# Patient Record
Sex: FEMALE | Race: WHITE
[De-identification: names, ages, dates, MRNs, and addresses within clinical notes are randomized per-mention and may not be internally consistent; named-entity substitution may affect disease eponyms.]

---

## 2019-12-26 ENCOUNTER — HOSPITAL ENCOUNTER (INPATIENT)
Dept: HOSPITAL 63 - 1 SOUTH | Age: 79
LOS: 5 days | Discharge: NURSING, REHAB OR LONG-TERM CARE | DRG: 193 | End: 2019-12-31
Attending: FAMILY MEDICINE | Admitting: FAMILY MEDICINE
Payer: COMMERCIAL

## 2019-12-26 VITALS — DIASTOLIC BLOOD PRESSURE: 75 MMHG | SYSTOLIC BLOOD PRESSURE: 146 MMHG

## 2019-12-26 VITALS — DIASTOLIC BLOOD PRESSURE: 74 MMHG | SYSTOLIC BLOOD PRESSURE: 153 MMHG

## 2019-12-26 VITALS — SYSTOLIC BLOOD PRESSURE: 172 MMHG | DIASTOLIC BLOOD PRESSURE: 84 MMHG

## 2019-12-26 VITALS — WEIGHT: 198 LBS | BODY MASS INDEX: 37.38 KG/M2 | HEIGHT: 61 IN

## 2019-12-26 VITALS — SYSTOLIC BLOOD PRESSURE: 150 MMHG | DIASTOLIC BLOOD PRESSURE: 69 MMHG

## 2019-12-26 VITALS — DIASTOLIC BLOOD PRESSURE: 77 MMHG | SYSTOLIC BLOOD PRESSURE: 161 MMHG

## 2019-12-26 VITALS — SYSTOLIC BLOOD PRESSURE: 146 MMHG | DIASTOLIC BLOOD PRESSURE: 62 MMHG

## 2019-12-26 DIAGNOSIS — J96.00: ICD-10-CM

## 2019-12-26 DIAGNOSIS — E11.22: ICD-10-CM

## 2019-12-26 DIAGNOSIS — D69.6: ICD-10-CM

## 2019-12-26 DIAGNOSIS — I50.9: ICD-10-CM

## 2019-12-26 DIAGNOSIS — E43: ICD-10-CM

## 2019-12-26 DIAGNOSIS — E11.65: ICD-10-CM

## 2019-12-26 DIAGNOSIS — J10.00: Primary | ICD-10-CM

## 2019-12-26 DIAGNOSIS — N18.3: ICD-10-CM

## 2019-12-26 LAB
ANION GAP SERPL CALC-SCNC: 7 MMOL/L (ref 6–14)
APTT PPP: YELLOW S
BACTERIA #/AREA URNS HPF: 0 /HPF
BASOPHILS # BLD AUTO: 0 X10^3/UL (ref 0–0.2)
BASOPHILS NFR BLD: 0 % (ref 0–3)
BGAS PCO2: 52 MMHG (ref 35–45)
BGAS PH: 7.36 (ref 7.35–7.45)
BGAS PO2: 94 MMHG (ref 71–100)
BILIRUB UR QL STRIP: (no result)
CA-I SERPL ISE-MCNC: 17 MG/DL (ref 7–20)
CALCIUM SERPL-MCNC: 8 MG/DL (ref 8.5–10.1)
CHLORIDE SERPL-SCNC: 101 MMOL/L (ref 98–107)
CO2 SERPL-SCNC: 30 MMOL/L (ref 21–32)
CREAT SERPL-MCNC: 1.3 MG/DL (ref 0.6–1)
DELTA BASE BGAS: 4 MMOL/L (ref 0–3)
EOSINOPHIL NFR BLD: 0 % (ref 0–3)
EOSINOPHIL NFR BLD: 0 X10^3/UL (ref 0–0.7)
ERYTHROCYTE [DISTWIDTH] IN BLOOD BY AUTOMATED COUNT: 14.1 % (ref 11.5–14.5)
FIBRINOGEN PPP-MCNC: CLEAR MG/DL
GFR SERPLBLD BASED ON 1.73 SQ M-ARVRAT: 39.5 ML/MIN
GLUCOSE SERPL-MCNC: 147 MG/DL (ref 70–99)
GLUCOSE UR STRIP-MCNC: (no result) MG/DL
HCT VFR BLD CALC: 41.8 % (ref 36–47)
HGB BLD-MCNC: 13.7 G/DL (ref 12–15.5)
INFLUENZA A PATIENT: POSITIVE
INFLUENZA B PATIENT: NEGATIVE
LYMPHOCYTES # BLD: 0.6 X10^3/UL (ref 1–4.8)
LYMPHOCYTES NFR BLD AUTO: 4 % (ref 24–48)
MCH RBC QN AUTO: 30 PG (ref 25–35)
MCHC RBC AUTO-ENTMCNC: 33 G/DL (ref 31–37)
MCV RBC AUTO: 92 FL (ref 79–100)
MONO #: 1 X10^3/UL (ref 0–1.1)
MONOCYTES NFR BLD: 6 % (ref 0–9)
NEUT #: 14.5 X10^3UL (ref 1.8–7.7)
NEUTROPHILS NFR BLD AUTO: 90 % (ref 31–73)
NITRITE UR QL STRIP: (no result)
O2 SAT BGAS: 96 % (ref 92–99)
O2/TOTAL GAS SETTING VFR VENT: 32 %
PLATELET # BLD AUTO: 119 X10^3/UL (ref 140–400)
POTASSIUM SERPL-SCNC: 4.4 MMOL/L (ref 3.5–5.1)
RBC # BLD AUTO: 4.57 X10^6/UL (ref 3.5–5.4)
RBC #/AREA URNS HPF: (no result) /HPF (ref 0–2)
SODIUM SERPL-SCNC: 138 MMOL/L (ref 136–145)
SP GR UR STRIP: 1.02
SQUAMOUS #/AREA URNS LPF: (no result) /LPF
UROBILINOGEN UR-MCNC: 0.2 MG/DL
WBC # BLD AUTO: 16.1 X10^3/UL (ref 4–11)
WBC #/AREA URNS HPF: (no result) /HPF (ref 0–4)

## 2019-12-26 PROCEDURE — A9558 XE133 XENON 10MCI: HCPCS

## 2019-12-26 PROCEDURE — 81001 URINALYSIS AUTO W/SCOPE: CPT

## 2019-12-26 PROCEDURE — 96374 THER/PROPH/DIAG INJ IV PUSH: CPT

## 2019-12-26 PROCEDURE — 82803 BLOOD GASES ANY COMBINATION: CPT

## 2019-12-26 PROCEDURE — 87449 NOS EACH ORGANISM AG IA: CPT

## 2019-12-26 PROCEDURE — A9540 TC99M MAA: HCPCS

## 2019-12-26 PROCEDURE — 84145 PROCALCITONIN (PCT): CPT

## 2019-12-26 PROCEDURE — 87040 BLOOD CULTURE FOR BACTERIA: CPT

## 2019-12-26 PROCEDURE — 85025 COMPLETE CBC W/AUTO DIFF WBC: CPT

## 2019-12-26 PROCEDURE — 71045 X-RAY EXAM CHEST 1 VIEW: CPT

## 2019-12-26 PROCEDURE — 78582 LUNG VENTILAT&PERFUS IMAGING: CPT

## 2019-12-26 PROCEDURE — 80202 ASSAY OF VANCOMYCIN: CPT

## 2019-12-26 PROCEDURE — 82550 ASSAY OF CK (CPK): CPT

## 2019-12-26 PROCEDURE — 85007 BL SMEAR W/DIFF WBC COUNT: CPT

## 2019-12-26 PROCEDURE — 85379 FIBRIN DEGRADATION QUANT: CPT

## 2019-12-26 PROCEDURE — 80053 COMPREHEN METABOLIC PANEL: CPT

## 2019-12-26 PROCEDURE — 87804 INFLUENZA ASSAY W/OPTIC: CPT

## 2019-12-26 PROCEDURE — 80048 BASIC METABOLIC PNL TOTAL CA: CPT

## 2019-12-26 PROCEDURE — 83605 ASSAY OF LACTIC ACID: CPT

## 2019-12-26 PROCEDURE — 36415 COLL VENOUS BLD VENIPUNCTURE: CPT

## 2019-12-26 PROCEDURE — 94640 AIRWAY INHALATION TREATMENT: CPT

## 2019-12-26 PROCEDURE — 93005 ELECTROCARDIOGRAM TRACING: CPT

## 2019-12-26 RX ADMIN — IPRATROPIUM BROMIDE AND ALBUTEROL SULFATE SCH ML: .5; 3 SOLUTION RESPIRATORY (INHALATION) at 21:50

## 2019-12-26 RX ADMIN — OSELTAMIVIR PHOSPHATE SCH MG: 75 CAPSULE ORAL at 20:21

## 2019-12-26 RX ADMIN — VANCOMYCIN HYDROCHLORIDE PRN EACH: 1 INJECTION, POWDER, LYOPHILIZED, FOR SOLUTION INTRAVENOUS at 22:39

## 2019-12-26 NOTE — EKG
[facility] [address], [place] [address]

Test Date:    2019               Test Time:    18:36:56

Pat Name:     TAMY VILLAREAL            Department:   

Patient ID:   [id number]           Room:         117 A

Gender:       F                        Technician:   JUAN

:          [id number]               Requested By: SHAYLA DAVALOS

Order Number: [id number]            Reading MD:     

                                 Measurements

Intervals                              Axis          

Rate:         143                      P:            -90

NC:           88                       QRS:          -40

QRSD:         108                      T:            26

QT:           294                                    

QTc:          459                                    

                           Interpretive Statements

SUPRAVENTRICULAR TACHYCARDIA

COMPLEX(ES) WITH ABERRANT INTRAVENTRICULAR CONDUCTION

VENTRICULAR PREMATURE COMPLEX(ES)

ABNORMAL LEFT AXIS DEVIATION

R-S TRANSITION ZONE IN V LEADS DISPLACED TO THE LEFT

LOW LIMB LEAD VOLTAGE

QRS(T) CONTOUR ABNORMALITY

CONSISTENT WITH INFERIOR INFARCT

AGE UNDETERMINED

ABNORMAL ECG

RI6.02

No previous ECG available for comparison

## 2019-12-26 NOTE — RAD
EXAM: AP View of the chest

 

DATE: 12/26/2019 5:14 PM

 

INDICATION: Shortness of air 

 

COMPARISON: No Prior

 

FINDINGS/

IMPRESSION:

Moderate cardiomegaly. Aorta is tortuous. Trace left pleural effusion. 

Patchy opacities medial right lung base may represent atelectasis or 

developing consolidation.

 

Electronically signed by: Erlin Landeros MD (12/26/2019 6:55 PM) [facility]

## 2019-12-27 VITALS — SYSTOLIC BLOOD PRESSURE: 140 MMHG | DIASTOLIC BLOOD PRESSURE: 68 MMHG

## 2019-12-27 VITALS — DIASTOLIC BLOOD PRESSURE: 55 MMHG | SYSTOLIC BLOOD PRESSURE: 135 MMHG

## 2019-12-27 VITALS — SYSTOLIC BLOOD PRESSURE: 111 MMHG | DIASTOLIC BLOOD PRESSURE: 60 MMHG

## 2019-12-27 VITALS — SYSTOLIC BLOOD PRESSURE: 128 MMHG | DIASTOLIC BLOOD PRESSURE: 61 MMHG

## 2019-12-27 VITALS — SYSTOLIC BLOOD PRESSURE: 124 MMHG | DIASTOLIC BLOOD PRESSURE: 64 MMHG

## 2019-12-27 VITALS — SYSTOLIC BLOOD PRESSURE: 119 MMHG | DIASTOLIC BLOOD PRESSURE: 59 MMHG

## 2019-12-27 VITALS — SYSTOLIC BLOOD PRESSURE: 134 MMHG | DIASTOLIC BLOOD PRESSURE: 66 MMHG

## 2019-12-27 VITALS — DIASTOLIC BLOOD PRESSURE: 66 MMHG | SYSTOLIC BLOOD PRESSURE: 137 MMHG

## 2019-12-27 VITALS — DIASTOLIC BLOOD PRESSURE: 57 MMHG | SYSTOLIC BLOOD PRESSURE: 127 MMHG

## 2019-12-27 VITALS — DIASTOLIC BLOOD PRESSURE: 67 MMHG | SYSTOLIC BLOOD PRESSURE: 136 MMHG

## 2019-12-27 VITALS — SYSTOLIC BLOOD PRESSURE: 139 MMHG | DIASTOLIC BLOOD PRESSURE: 68 MMHG

## 2019-12-27 VITALS — SYSTOLIC BLOOD PRESSURE: 130 MMHG | DIASTOLIC BLOOD PRESSURE: 61 MMHG

## 2019-12-27 VITALS — SYSTOLIC BLOOD PRESSURE: 128 MMHG | DIASTOLIC BLOOD PRESSURE: 58 MMHG

## 2019-12-27 VITALS — DIASTOLIC BLOOD PRESSURE: 60 MMHG | SYSTOLIC BLOOD PRESSURE: 121 MMHG

## 2019-12-27 VITALS — SYSTOLIC BLOOD PRESSURE: 125 MMHG | DIASTOLIC BLOOD PRESSURE: 71 MMHG

## 2019-12-27 VITALS — SYSTOLIC BLOOD PRESSURE: 121 MMHG | DIASTOLIC BLOOD PRESSURE: 68 MMHG

## 2019-12-27 VITALS — SYSTOLIC BLOOD PRESSURE: 139 MMHG | DIASTOLIC BLOOD PRESSURE: 71 MMHG

## 2019-12-27 VITALS — SYSTOLIC BLOOD PRESSURE: 126 MMHG | DIASTOLIC BLOOD PRESSURE: 63 MMHG

## 2019-12-27 VITALS — SYSTOLIC BLOOD PRESSURE: 143 MMHG | DIASTOLIC BLOOD PRESSURE: 70 MMHG

## 2019-12-27 VITALS — DIASTOLIC BLOOD PRESSURE: 74 MMHG | SYSTOLIC BLOOD PRESSURE: 140 MMHG

## 2019-12-27 VITALS — DIASTOLIC BLOOD PRESSURE: 56 MMHG | SYSTOLIC BLOOD PRESSURE: 126 MMHG

## 2019-12-27 VITALS — DIASTOLIC BLOOD PRESSURE: 61 MMHG | SYSTOLIC BLOOD PRESSURE: 119 MMHG

## 2019-12-27 LAB
% LYMPHS: 2 % (ref 24–48)
% MONOS: 6 % (ref 0–10)
% SEGS: 92 % (ref 35–66)
ANION GAP SERPL CALC-SCNC: 6 MMOL/L (ref 6–14)
CA-I SERPL ISE-MCNC: 18 MG/DL (ref 7–20)
CALCIUM SERPL-MCNC: 7.4 MG/DL (ref 8.5–10.1)
CHLORIDE SERPL-SCNC: 103 MMOL/L (ref 98–107)
CO2 SERPL-SCNC: 30 MMOL/L (ref 21–32)
CREAT SERPL-MCNC: 1.4 MG/DL (ref 0.6–1)
DACRYOCYTES BLD QL SMEAR: (no result)
GFR SERPLBLD BASED ON 1.73 SQ M-ARVRAT: 36.3 ML/MIN
GLUCOSE SERPL-MCNC: 123 MG/DL (ref 70–99)
PLATELET # BLD EST: ADEQUATE 10*3/UL
POLYCHROMASIA BLD QL SMEAR: SLIGHT
POTASSIUM SERPL-SCNC: 4.4 MMOL/L (ref 3.5–5.1)
SODIUM SERPL-SCNC: 139 MMOL/L (ref 136–145)

## 2019-12-27 RX ADMIN — OSELTAMIVIR PHOSPHATE SCH MG: 75 CAPSULE ORAL at 20:04

## 2019-12-27 RX ADMIN — ENOXAPARIN SODIUM SCH MG: 100 INJECTION SUBCUTANEOUS at 09:54

## 2019-12-27 RX ADMIN — PIPERACILLIN SODIUM AND TAZOBACTAM SODIUM SCH MLS/HR: 2; .25 INJECTION, POWDER, LYOPHILIZED, FOR SOLUTION INTRAVENOUS at 15:06

## 2019-12-27 RX ADMIN — IPRATROPIUM BROMIDE AND ALBUTEROL SULFATE SCH ML: .5; 3 SOLUTION RESPIRATORY (INHALATION) at 15:23

## 2019-12-27 RX ADMIN — PIPERACILLIN SODIUM AND TAZOBACTAM SODIUM SCH MLS/HR: 2; .25 INJECTION, POWDER, LYOPHILIZED, FOR SOLUTION INTRAVENOUS at 05:34

## 2019-12-27 RX ADMIN — Medication SCH CAP: at 20:04

## 2019-12-27 RX ADMIN — PIPERACILLIN SODIUM AND TAZOBACTAM SODIUM SCH MLS/HR: 2; .25 INJECTION, POWDER, LYOPHILIZED, FOR SOLUTION INTRAVENOUS at 00:01

## 2019-12-27 RX ADMIN — PIPERACILLIN SODIUM AND TAZOBACTAM SODIUM SCH MLS/HR: 2; .25 INJECTION, POWDER, LYOPHILIZED, FOR SOLUTION INTRAVENOUS at 18:42

## 2019-12-27 RX ADMIN — OSELTAMIVIR PHOSPHATE SCH MG: 75 CAPSULE ORAL at 09:53

## 2019-12-27 RX ADMIN — Medication SCH CAP: at 09:53

## 2019-12-27 RX ADMIN — IPRATROPIUM BROMIDE AND ALBUTEROL SULFATE SCH ML: .5; 3 SOLUTION RESPIRATORY (INHALATION) at 21:19

## 2019-12-27 RX ADMIN — VANCOMYCIN HYDROCHLORIDE SCH MLS/HR: 1 INJECTION, POWDER, LYOPHILIZED, FOR SOLUTION INTRAVENOUS at 20:04

## 2019-12-27 RX ADMIN — IPRATROPIUM BROMIDE AND ALBUTEROL SULFATE SCH ML: .5; 3 SOLUTION RESPIRATORY (INHALATION) at 06:02

## 2019-12-27 RX ADMIN — PIPERACILLIN SODIUM AND TAZOBACTAM SODIUM SCH MLS/HR: 2; .25 INJECTION, POWDER, LYOPHILIZED, FOR SOLUTION INTRAVENOUS at 23:33

## 2019-12-27 RX ADMIN — IPRATROPIUM BROMIDE AND ALBUTEROL SULFATE SCH ML: .5; 3 SOLUTION RESPIRATORY (INHALATION) at 09:10

## 2019-12-27 NOTE — PN
DATE:  



SUBJECTIVE:  The patient admitted with acute respiratory failure, pneumonia and

influenza A.  She is doing somewhat better this morning.  Her severe

bronchospasm has been diminished.  Blood pressure 127/57, respiratory rate 33,

pulse 103.  The patient otherwise is afebrile, although she was running 99.4,

given a rectal suppository.  She is on 5 liters at 96%.  The patient is

receiving IV antibiotic therapy for the pneumonia as well as Antiflu medication.



OBJECTIVE

LUNGS:  Diminished, improved, still rhonchi.

CARDIOVASCULAR:  Tachycardic.

ABDOMEN:  Soft, nontender.

EXTREMITIES:  No clubbing, cyanosis, or edema.  The patient is extremely hard of

hearing.



IMPRESSION:  Influenza A pneumonia of unspecified etiology secondary to

influenza, acute respiratory failure and chronic kidney disease stage 3.



PLAN:  Continue to monitor the patient, accordingly make further evaluation with

aggressive pulmonary toilet and IV antibiotic therapy and Antiflu.





______________________________

SHAYLA DAVALOS MD



DR:  NICO/jayson  JOB#:  [id number] / [id number]

DD:  12/27/2019 09:54  DT:  12/27/2019 10:49

## 2019-12-28 VITALS — SYSTOLIC BLOOD PRESSURE: 140 MMHG | DIASTOLIC BLOOD PRESSURE: 63 MMHG

## 2019-12-28 VITALS — DIASTOLIC BLOOD PRESSURE: 65 MMHG | SYSTOLIC BLOOD PRESSURE: 131 MMHG

## 2019-12-28 VITALS — DIASTOLIC BLOOD PRESSURE: 54 MMHG | SYSTOLIC BLOOD PRESSURE: 106 MMHG

## 2019-12-28 VITALS — DIASTOLIC BLOOD PRESSURE: 65 MMHG | SYSTOLIC BLOOD PRESSURE: 138 MMHG

## 2019-12-28 VITALS — DIASTOLIC BLOOD PRESSURE: 66 MMHG | SYSTOLIC BLOOD PRESSURE: 124 MMHG

## 2019-12-28 VITALS — SYSTOLIC BLOOD PRESSURE: 124 MMHG | DIASTOLIC BLOOD PRESSURE: 55 MMHG

## 2019-12-28 VITALS — DIASTOLIC BLOOD PRESSURE: 70 MMHG | SYSTOLIC BLOOD PRESSURE: 144 MMHG

## 2019-12-28 VITALS — DIASTOLIC BLOOD PRESSURE: 66 MMHG | SYSTOLIC BLOOD PRESSURE: 135 MMHG

## 2019-12-28 VITALS — SYSTOLIC BLOOD PRESSURE: 130 MMHG | DIASTOLIC BLOOD PRESSURE: 55 MMHG

## 2019-12-28 VITALS — SYSTOLIC BLOOD PRESSURE: 126 MMHG | DIASTOLIC BLOOD PRESSURE: 62 MMHG

## 2019-12-28 VITALS — SYSTOLIC BLOOD PRESSURE: 123 MMHG | DIASTOLIC BLOOD PRESSURE: 60 MMHG

## 2019-12-28 VITALS — DIASTOLIC BLOOD PRESSURE: 78 MMHG | SYSTOLIC BLOOD PRESSURE: 138 MMHG

## 2019-12-28 VITALS — DIASTOLIC BLOOD PRESSURE: 68 MMHG | SYSTOLIC BLOOD PRESSURE: 123 MMHG

## 2019-12-28 VITALS — DIASTOLIC BLOOD PRESSURE: 64 MMHG | SYSTOLIC BLOOD PRESSURE: 121 MMHG

## 2019-12-28 VITALS — DIASTOLIC BLOOD PRESSURE: 66 MMHG | SYSTOLIC BLOOD PRESSURE: 137 MMHG

## 2019-12-28 VITALS — SYSTOLIC BLOOD PRESSURE: 139 MMHG | DIASTOLIC BLOOD PRESSURE: 73 MMHG

## 2019-12-28 VITALS — DIASTOLIC BLOOD PRESSURE: 62 MMHG | SYSTOLIC BLOOD PRESSURE: 124 MMHG

## 2019-12-28 VITALS — DIASTOLIC BLOOD PRESSURE: 70 MMHG | SYSTOLIC BLOOD PRESSURE: 143 MMHG

## 2019-12-28 VITALS — SYSTOLIC BLOOD PRESSURE: 141 MMHG | DIASTOLIC BLOOD PRESSURE: 63 MMHG

## 2019-12-28 VITALS — DIASTOLIC BLOOD PRESSURE: 64 MMHG | SYSTOLIC BLOOD PRESSURE: 135 MMHG

## 2019-12-28 LAB
ALBUMIN SERPL-MCNC: 2.5 G/DL (ref 3.4–5)
ALBUMIN/GLOB SERPL: 0.7 {RATIO} (ref 1–1.7)
ALP SERPL-CCNC: 59 U/L (ref 46–116)
ALT SERPL-CCNC: 25 U/L (ref 14–59)
ANION GAP SERPL CALC-SCNC: 6 MMOL/L (ref 6–14)
AST SERPL-CCNC: 29 U/L (ref 15–37)
BASOPHILS # BLD AUTO: 0 X10^3/UL (ref 0–0.2)
BASOPHILS NFR BLD: 0 % (ref 0–3)
BILIRUB SERPL-MCNC: 0.5 MG/DL (ref 0.2–1)
BUN/CREAT SERPL: 18 (ref 6–20)
CA-I SERPL ISE-MCNC: 24 MG/DL (ref 7–20)
CALCIUM SERPL-MCNC: 7.2 MG/DL (ref 8.5–10.1)
CHLORIDE SERPL-SCNC: 103 MMOL/L (ref 98–107)
CO2 SERPL-SCNC: 29 MMOL/L (ref 21–32)
CREAT SERPL-MCNC: 1.3 MG/DL (ref 0.6–1)
EOSINOPHIL NFR BLD: 0 % (ref 0–3)
EOSINOPHIL NFR BLD: 0 X10^3/UL (ref 0–0.7)
ERYTHROCYTE [DISTWIDTH] IN BLOOD BY AUTOMATED COUNT: 13.9 % (ref 11.5–14.5)
GFR SERPLBLD BASED ON 1.73 SQ M-ARVRAT: 39.5 ML/MIN
GLOBULIN SER-MCNC: 3.8 G/DL (ref 2.2–3.8)
GLUCOSE SERPL-MCNC: 115 MG/DL (ref 70–99)
HCT VFR BLD CALC: 37.3 % (ref 36–47)
HGB BLD-MCNC: 12.3 G/DL (ref 12–15.5)
LYMPHOCYTES # BLD: 1.8 X10^3/UL (ref 1–4.8)
LYMPHOCYTES NFR BLD AUTO: 11 % (ref 24–48)
MCH RBC QN AUTO: 30 PG (ref 25–35)
MCHC RBC AUTO-ENTMCNC: 33 G/DL (ref 31–37)
MCV RBC AUTO: 90 FL (ref 79–100)
MONO #: 0.9 X10^3/UL (ref 0–1.1)
MONOCYTES NFR BLD: 5 % (ref 0–9)
NEUT #: 14.1 X10^3UL (ref 1.8–7.7)
NEUTROPHILS NFR BLD AUTO: 84 % (ref 31–73)
PLATELET # BLD AUTO: 91 X10^3/UL (ref 140–400)
POTASSIUM SERPL-SCNC: 3.9 MMOL/L (ref 3.5–5.1)
PROT SERPL-MCNC: 6.3 G/DL (ref 6.4–8.2)
RBC # BLD AUTO: 4.16 X10^6/UL (ref 3.5–5.4)
SODIUM SERPL-SCNC: 138 MMOL/L (ref 136–145)
VANC TR: 13.7 MCG/ML (ref 10–20)
WBC # BLD AUTO: 16.8 X10^3/UL (ref 4–11)

## 2019-12-28 RX ADMIN — IPRATROPIUM BROMIDE AND ALBUTEROL SULFATE SCH ML: .5; 3 SOLUTION RESPIRATORY (INHALATION) at 15:52

## 2019-12-28 RX ADMIN — IPRATROPIUM BROMIDE AND ALBUTEROL SULFATE SCH ML: .5; 3 SOLUTION RESPIRATORY (INHALATION) at 20:17

## 2019-12-28 RX ADMIN — Medication SCH CAP: at 08:37

## 2019-12-28 RX ADMIN — IPRATROPIUM BROMIDE AND ALBUTEROL SULFATE SCH ML: .5; 3 SOLUTION RESPIRATORY (INHALATION) at 05:03

## 2019-12-28 RX ADMIN — ENOXAPARIN SODIUM SCH MG: 100 INJECTION SUBCUTANEOUS at 08:38

## 2019-12-28 RX ADMIN — PIPERACILLIN SODIUM AND TAZOBACTAM SODIUM SCH MLS/HR: 2; .25 INJECTION, POWDER, LYOPHILIZED, FOR SOLUTION INTRAVENOUS at 18:03

## 2019-12-28 RX ADMIN — VANCOMYCIN HYDROCHLORIDE PRN EACH: 1 INJECTION, POWDER, LYOPHILIZED, FOR SOLUTION INTRAVENOUS at 20:16

## 2019-12-28 RX ADMIN — VANCOMYCIN HYDROCHLORIDE SCH MLS/HR: 1 INJECTION, POWDER, LYOPHILIZED, FOR SOLUTION INTRAVENOUS at 20:51

## 2019-12-28 RX ADMIN — PIPERACILLIN SODIUM AND TAZOBACTAM SODIUM SCH MLS/HR: 2; .25 INJECTION, POWDER, LYOPHILIZED, FOR SOLUTION INTRAVENOUS at 05:54

## 2019-12-28 RX ADMIN — OSELTAMIVIR PHOSPHATE SCH MG: 75 CAPSULE ORAL at 08:37

## 2019-12-28 RX ADMIN — FUROSEMIDE SCH MG: 10 INJECTION, SOLUTION INTRAMUSCULAR; INTRAVENOUS at 15:54

## 2019-12-28 RX ADMIN — IPRATROPIUM BROMIDE AND ALBUTEROL SULFATE SCH ML: .5; 3 SOLUTION RESPIRATORY (INHALATION) at 10:35

## 2019-12-28 RX ADMIN — Medication SCH CAP: at 20:50

## 2019-12-28 RX ADMIN — PIPERACILLIN SODIUM AND TAZOBACTAM SODIUM SCH MLS/HR: 2; .25 INJECTION, POWDER, LYOPHILIZED, FOR SOLUTION INTRAVENOUS at 12:57

## 2019-12-28 RX ADMIN — OSELTAMIVIR PHOSPHATE SCH MG: 75 CAPSULE ORAL at 20:50

## 2019-12-28 NOTE — PN
DATE:  



SUBJECTIVE:  A 79-year-old female who came in with both influenza A and a

pneumonic process.  The patient has not been diuresing very well.  We give her

additional Lasix, although she finally did diurese.



OBJECTIVE:

VITAL SIGNS:  Blood pressure is 144/70, respiratory rate 20, pulse 92, afebrile,

oxygen saturation on 4 liters 93%.  She is usually on oxygen.  She is doing

better and improving.  We will keep a close eye on her.  Her white count is

still elevated; however, she is on piperacillin and vancomycin as well as

Tamiflu, getting breathing treatments.

LUNGS:  Diminished throughout, but much better.  Some coarse breath sounds, but

better than they have been.

CARDIOVASCULAR:  Irregularly irregular rhythm.

ABDOMEN:  Soft, nontender.

EXTREMITIES:  No clubbing, cyanosis or edema.

NEUROLOGIC:  Intact.



IMPRESSION AND PLAN:  Pneumonia of unspecified etiology secondary to influenza

A, acute respiratory failure, chronic kidney disease stage 3, thrombocytopenia. 

Platelet count of 91,000.  Continue to monitor.  Severe protein malnutrition,

hyperglycemia.  Procalcitonin elevated.





______________________________

SHAYLA DAVALOS MD



DR:  NICO/jayson  JOB#:  [id number] / [id number]

DD:  12/28/2019 15:37  DT:  12/28/2019 22:22

## 2019-12-28 NOTE — RAD
EXAM: CHEST 1 VIEW 

 

History: Shortness of breath 

 

COMPARISON: 12/26/2019

 

TECHNIQUE: Single portable radiograph of the chest

 

FINDINGS:  Mild cardiomegaly. Mild left lung base airspace opacity likely 

atelectasis or infiltrate or minimal effusion.

 

IMPRESSION:  

1. Mild increase in congestive changes. Mild left lung base airspace 

opacity likely atelectasis or infiltrate.

 

 

Electronically signed by: Lavelle Ferguson MD (12/28/2019 10:25 AM) [facility]

## 2019-12-29 VITALS — SYSTOLIC BLOOD PRESSURE: 121 MMHG | DIASTOLIC BLOOD PRESSURE: 63 MMHG

## 2019-12-29 VITALS — SYSTOLIC BLOOD PRESSURE: 142 MMHG | DIASTOLIC BLOOD PRESSURE: 64 MMHG

## 2019-12-29 VITALS — DIASTOLIC BLOOD PRESSURE: 61 MMHG | SYSTOLIC BLOOD PRESSURE: 136 MMHG

## 2019-12-29 VITALS — SYSTOLIC BLOOD PRESSURE: 113 MMHG | DIASTOLIC BLOOD PRESSURE: 58 MMHG

## 2019-12-29 VITALS — SYSTOLIC BLOOD PRESSURE: 124 MMHG | DIASTOLIC BLOOD PRESSURE: 59 MMHG

## 2019-12-29 VITALS — SYSTOLIC BLOOD PRESSURE: 120 MMHG | DIASTOLIC BLOOD PRESSURE: 65 MMHG

## 2019-12-29 VITALS — SYSTOLIC BLOOD PRESSURE: 122 MMHG | DIASTOLIC BLOOD PRESSURE: 60 MMHG

## 2019-12-29 VITALS — SYSTOLIC BLOOD PRESSURE: 132 MMHG | DIASTOLIC BLOOD PRESSURE: 64 MMHG

## 2019-12-29 VITALS — SYSTOLIC BLOOD PRESSURE: 130 MMHG | DIASTOLIC BLOOD PRESSURE: 68 MMHG

## 2019-12-29 VITALS — DIASTOLIC BLOOD PRESSURE: 68 MMHG | SYSTOLIC BLOOD PRESSURE: 128 MMHG

## 2019-12-29 VITALS — SYSTOLIC BLOOD PRESSURE: 129 MMHG | DIASTOLIC BLOOD PRESSURE: 64 MMHG

## 2019-12-29 VITALS — SYSTOLIC BLOOD PRESSURE: 138 MMHG | DIASTOLIC BLOOD PRESSURE: 68 MMHG

## 2019-12-29 VITALS — SYSTOLIC BLOOD PRESSURE: 131 MMHG | DIASTOLIC BLOOD PRESSURE: 58 MMHG

## 2019-12-29 VITALS — SYSTOLIC BLOOD PRESSURE: 142 MMHG | DIASTOLIC BLOOD PRESSURE: 67 MMHG

## 2019-12-29 VITALS — DIASTOLIC BLOOD PRESSURE: 67 MMHG | SYSTOLIC BLOOD PRESSURE: 127 MMHG

## 2019-12-29 LAB
ANION GAP SERPL CALC-SCNC: 8 MMOL/L (ref 6–14)
BASOPHILS # BLD AUTO: 0 X10^3/UL (ref 0–0.2)
BASOPHILS NFR BLD: 0 % (ref 0–3)
CA-I SERPL ISE-MCNC: 22 MG/DL (ref 7–20)
CALCIUM SERPL-MCNC: 6.7 MG/DL (ref 8.5–10.1)
CHLORIDE SERPL-SCNC: 103 MMOL/L (ref 98–107)
CO2 SERPL-SCNC: 31 MMOL/L (ref 21–32)
CREAT SERPL-MCNC: 1.4 MG/DL (ref 0.6–1)
EOSINOPHIL NFR BLD: 0 % (ref 0–3)
EOSINOPHIL NFR BLD: 0 X10^3/UL (ref 0–0.7)
ERYTHROCYTE [DISTWIDTH] IN BLOOD BY AUTOMATED COUNT: 14 % (ref 11.5–14.5)
GFR SERPLBLD BASED ON 1.73 SQ M-ARVRAT: 36.3 ML/MIN
GLUCOSE SERPL-MCNC: 161 MG/DL (ref 70–99)
HCT VFR BLD CALC: 37.4 % (ref 36–47)
HGB BLD-MCNC: 12.3 G/DL (ref 12–15.5)
LYMPHOCYTES # BLD: 1.1 X10^3/UL (ref 1–4.8)
LYMPHOCYTES NFR BLD AUTO: 9 % (ref 24–48)
MCH RBC QN AUTO: 30 PG (ref 25–35)
MCHC RBC AUTO-ENTMCNC: 33 G/DL (ref 31–37)
MCV RBC AUTO: 90 FL (ref 79–100)
MONO #: 0.7 X10^3/UL (ref 0–1.1)
MONOCYTES NFR BLD: 6 % (ref 0–9)
NEUT #: 10 X10^3UL (ref 1.8–7.7)
NEUTROPHILS NFR BLD AUTO: 85 % (ref 31–73)
PLATELET # BLD AUTO: 124 X10^3/UL (ref 140–400)
POTASSIUM SERPL-SCNC: 3.4 MMOL/L (ref 3.5–5.1)
RBC # BLD AUTO: 4.15 X10^6/UL (ref 3.5–5.4)
SODIUM SERPL-SCNC: 142 MMOL/L (ref 136–145)
WBC # BLD AUTO: 11.9 X10^3/UL (ref 4–11)

## 2019-12-29 RX ADMIN — OSELTAMIVIR PHOSPHATE SCH MG: 75 CAPSULE ORAL at 21:01

## 2019-12-29 RX ADMIN — IPRATROPIUM BROMIDE AND ALBUTEROL SULFATE SCH ML: .5; 3 SOLUTION RESPIRATORY (INHALATION) at 05:23

## 2019-12-29 RX ADMIN — PIPERACILLIN SODIUM AND TAZOBACTAM SODIUM SCH MLS/HR: 2; .25 INJECTION, POWDER, LYOPHILIZED, FOR SOLUTION INTRAVENOUS at 00:23

## 2019-12-29 RX ADMIN — IPRATROPIUM BROMIDE AND ALBUTEROL SULFATE SCH ML: .5; 3 SOLUTION RESPIRATORY (INHALATION) at 11:08

## 2019-12-29 RX ADMIN — IPRATROPIUM BROMIDE AND ALBUTEROL SULFATE SCH ML: .5; 3 SOLUTION RESPIRATORY (INHALATION) at 21:32

## 2019-12-29 RX ADMIN — ENOXAPARIN SODIUM SCH MG: 100 INJECTION SUBCUTANEOUS at 09:23

## 2019-12-29 RX ADMIN — PIPERACILLIN SODIUM AND TAZOBACTAM SODIUM SCH MLS/HR: 2; .25 INJECTION, POWDER, LYOPHILIZED, FOR SOLUTION INTRAVENOUS at 11:19

## 2019-12-29 RX ADMIN — IPRATROPIUM BROMIDE AND ALBUTEROL SULFATE SCH ML: .5; 3 SOLUTION RESPIRATORY (INHALATION) at 16:27

## 2019-12-29 RX ADMIN — PIPERACILLIN SODIUM AND TAZOBACTAM SODIUM SCH MLS/HR: 2; .25 INJECTION, POWDER, LYOPHILIZED, FOR SOLUTION INTRAVENOUS at 06:18

## 2019-12-29 RX ADMIN — OSELTAMIVIR PHOSPHATE SCH MG: 75 CAPSULE ORAL at 09:23

## 2019-12-29 RX ADMIN — Medication SCH CAP: at 21:01

## 2019-12-29 RX ADMIN — FUROSEMIDE SCH MG: 10 INJECTION, SOLUTION INTRAMUSCULAR; INTRAVENOUS at 09:23

## 2019-12-29 RX ADMIN — Medication SCH CAP: at 09:23

## 2019-12-29 RX ADMIN — VANCOMYCIN HYDROCHLORIDE SCH MLS/HR: 1 INJECTION, POWDER, LYOPHILIZED, FOR SOLUTION INTRAVENOUS at 21:11

## 2019-12-29 RX ADMIN — PIPERACILLIN SODIUM AND TAZOBACTAM SODIUM SCH MLS/HR: 2; .25 INJECTION, POWDER, LYOPHILIZED, FOR SOLUTION INTRAVENOUS at 18:00

## 2019-12-29 NOTE — PN
DATE:  



SUBJECTIVE:  A 79-year-old female in with influenza A, I think secondary

pneumonic process.  Chest x-rays did show infiltrative processes and some mild

congestion, but she has harsh rhonchi throughout.  She has done well on the

antibiotics for now.  We will try to collect sputum specimens on her as well.



OBJECTIVE:

VITAL SIGNS:  Blood pressure 124/59, respiratory rate 18, pulse 90, afebrile,

oxygen on 3 liters 92%.

GENERAL:  The patient is alert and oriented.  Speech is fluent, spontaneous,

appropriate.

LUNGS:  Diminished, still some rhonchi noted, but markedly improved.

CARDIOVASCULAR:  Regular sinus rhythm.

ABDOMEN:  Soft, protuberant, nontender.

EXTREMITIES:  No clubbing, cyanosis, nor edema.

NEUROLOGIC:  The patient was alert and oriented x 3.



PLAN:  We will go ahead and continue the IV antibiotic therapy, aggressive

pulmonary toilet and see how she progresses, probably will need to live in

assisted living as she is becoming increasingly more weak, but she does get good

care with her niece at home.



IMPRESSION:  Therefore, influenza A pneumonia of unspecified etiology secondary

to the influenza, acute respiratory failure, hyperglycemia, leukocytosis,

chronic kidney disease stage 3, severe protein malnutrition, pseudohypocalcemia.





______________________________

SHAYLA DAVALOS MD



DR:  NICO/jayson  JOB#:  [id number] / [id number]

DD:  12/29/2019 09:42  DT:  12/29/2019 11:31

## 2019-12-30 VITALS — DIASTOLIC BLOOD PRESSURE: 71 MMHG | SYSTOLIC BLOOD PRESSURE: 135 MMHG

## 2019-12-30 VITALS — DIASTOLIC BLOOD PRESSURE: 54 MMHG | SYSTOLIC BLOOD PRESSURE: 136 MMHG

## 2019-12-30 VITALS — DIASTOLIC BLOOD PRESSURE: 74 MMHG | SYSTOLIC BLOOD PRESSURE: 136 MMHG

## 2019-12-30 VITALS — SYSTOLIC BLOOD PRESSURE: 128 MMHG | DIASTOLIC BLOOD PRESSURE: 58 MMHG

## 2019-12-30 LAB
ANION GAP SERPL CALC-SCNC: 6 MMOL/L (ref 6–14)
CA-I SERPL ISE-MCNC: 23 MG/DL (ref 7–20)
CALCIUM SERPL-MCNC: 7.2 MG/DL (ref 8.5–10.1)
CHLORIDE SERPL-SCNC: 106 MMOL/L (ref 98–107)
CO2 SERPL-SCNC: 32 MMOL/L (ref 21–32)
CREAT SERPL-MCNC: 1.2 MG/DL (ref 0.6–1)
GFR SERPLBLD BASED ON 1.73 SQ M-ARVRAT: 43.3 ML/MIN
GLUCOSE SERPL-MCNC: 131 MG/DL (ref 70–99)
POTASSIUM SERPL-SCNC: 4.3 MMOL/L (ref 3.5–5.1)
SODIUM SERPL-SCNC: 144 MMOL/L (ref 136–145)
VANC TR: 14.1 MCG/ML (ref 10–20)

## 2019-12-30 RX ADMIN — Medication SCH CAP: at 08:53

## 2019-12-30 RX ADMIN — Medication SCH CAP: at 20:53

## 2019-12-30 RX ADMIN — PIPERACILLIN SODIUM AND TAZOBACTAM SODIUM SCH MLS/HR: 2; .25 INJECTION, POWDER, LYOPHILIZED, FOR SOLUTION INTRAVENOUS at 18:10

## 2019-12-30 RX ADMIN — VANCOMYCIN HYDROCHLORIDE PRN EACH: 1 INJECTION, POWDER, LYOPHILIZED, FOR SOLUTION INTRAVENOUS at 20:24

## 2019-12-30 RX ADMIN — OSELTAMIVIR PHOSPHATE SCH MG: 75 CAPSULE ORAL at 20:53

## 2019-12-30 RX ADMIN — FUROSEMIDE SCH MG: 10 INJECTION, SOLUTION INTRAMUSCULAR; INTRAVENOUS at 08:53

## 2019-12-30 RX ADMIN — IPRATROPIUM BROMIDE AND ALBUTEROL SULFATE SCH ML: .5; 3 SOLUTION RESPIRATORY (INHALATION) at 11:32

## 2019-12-30 RX ADMIN — IPRATROPIUM BROMIDE AND ALBUTEROL SULFATE SCH ML: .5; 3 SOLUTION RESPIRATORY (INHALATION) at 18:09

## 2019-12-30 RX ADMIN — VANCOMYCIN HYDROCHLORIDE SCH MLS/HR: 1 INJECTION, POWDER, LYOPHILIZED, FOR SOLUTION INTRAVENOUS at 20:54

## 2019-12-30 RX ADMIN — PIPERACILLIN SODIUM AND TAZOBACTAM SODIUM SCH MLS/HR: 2; .25 INJECTION, POWDER, LYOPHILIZED, FOR SOLUTION INTRAVENOUS at 00:32

## 2019-12-30 RX ADMIN — OSELTAMIVIR PHOSPHATE SCH MG: 75 CAPSULE ORAL at 08:53

## 2019-12-30 RX ADMIN — IPRATROPIUM BROMIDE AND ALBUTEROL SULFATE SCH ML: .5; 3 SOLUTION RESPIRATORY (INHALATION) at 19:59

## 2019-12-30 RX ADMIN — PIPERACILLIN SODIUM AND TAZOBACTAM SODIUM SCH MLS/HR: 2; .25 INJECTION, POWDER, LYOPHILIZED, FOR SOLUTION INTRAVENOUS at 05:58

## 2019-12-30 RX ADMIN — PIPERACILLIN SODIUM AND TAZOBACTAM SODIUM SCH MLS/HR: 2; .25 INJECTION, POWDER, LYOPHILIZED, FOR SOLUTION INTRAVENOUS at 23:29

## 2019-12-30 RX ADMIN — PIPERACILLIN SODIUM AND TAZOBACTAM SODIUM SCH MLS/HR: 2; .25 INJECTION, POWDER, LYOPHILIZED, FOR SOLUTION INTRAVENOUS at 12:00

## 2019-12-30 RX ADMIN — IPRATROPIUM BROMIDE AND ALBUTEROL SULFATE SCH ML: .5; 3 SOLUTION RESPIRATORY (INHALATION) at 16:00

## 2019-12-30 RX ADMIN — IPRATROPIUM BROMIDE AND ALBUTEROL SULFATE SCH ML: .5; 3 SOLUTION RESPIRATORY (INHALATION) at 04:46

## 2019-12-30 RX ADMIN — ENOXAPARIN SODIUM SCH MG: 100 INJECTION SUBCUTANEOUS at 08:54

## 2019-12-30 NOTE — PN
DATE:  



SUBJECTIVE:  A 79-year-old female in with acute respiratory failure with

pneumonia as well as the influenza A.  She is doing much better, looking a

little stronger, but still very weak.



OBJECTIVE:

VITAL SIGNS:  Blood pressure 136/74, respiration 18, pulse 85, afebrile.

GENERAL:  The patient is alert and oriented.

LUNGS:  Show upper lobe rhonchi noted, but markedly improved when they are

moving.  She is on 4 liters at 98%.

CARDIOVASCULAR:  Irregularly irregular rhythm.

ABDOMEN:  Soft, nontender.

EXTREMITIES:  No clubbing, cyanosis.  Extremities did show some mild edema.

NEUROLOGIC:  The patient was alert and oriented x 3.  



IMPRESSION:  Therefore, influenza A pneumonia of unspecified etiology, acute

respiratory failure, hyperglycemia, leukocytosis, chronic kidney disease, severe

protein malnutrition, and pseudohypocalcemia.



PLAN:  The patient  will continue to be monitored carefully, consider possible

skilled unit for continued PT, OT would be advisable for this individual.





______________________________

SHAYLA DAVALOS MD



DR:  NICO/jayson  JOB#:  [id number] / [id number]

DD:  12/30/2019 09:32  DT:  12/30/2019 10:49

## 2019-12-31 VITALS — SYSTOLIC BLOOD PRESSURE: 133 MMHG | DIASTOLIC BLOOD PRESSURE: 55 MMHG

## 2019-12-31 VITALS — DIASTOLIC BLOOD PRESSURE: 54 MMHG | SYSTOLIC BLOOD PRESSURE: 120 MMHG

## 2019-12-31 VITALS — DIASTOLIC BLOOD PRESSURE: 50 MMHG | SYSTOLIC BLOOD PRESSURE: 108 MMHG

## 2019-12-31 RX ADMIN — PIPERACILLIN SODIUM AND TAZOBACTAM SODIUM SCH MLS/HR: 2; .25 INJECTION, POWDER, LYOPHILIZED, FOR SOLUTION INTRAVENOUS at 05:42

## 2019-12-31 RX ADMIN — PIPERACILLIN SODIUM AND TAZOBACTAM SODIUM SCH MLS/HR: 2; .25 INJECTION, POWDER, LYOPHILIZED, FOR SOLUTION INTRAVENOUS at 11:58

## 2019-12-31 RX ADMIN — OSELTAMIVIR PHOSPHATE SCH MG: 75 CAPSULE ORAL at 08:48

## 2019-12-31 RX ADMIN — IPRATROPIUM BROMIDE AND ALBUTEROL SULFATE SCH ML: .5; 3 SOLUTION RESPIRATORY (INHALATION) at 19:05

## 2019-12-31 RX ADMIN — ENOXAPARIN SODIUM SCH MG: 100 INJECTION SUBCUTANEOUS at 08:49

## 2019-12-31 RX ADMIN — Medication SCH CAP: at 08:48

## 2019-12-31 RX ADMIN — FUROSEMIDE SCH MG: 10 INJECTION, SOLUTION INTRAMUSCULAR; INTRAVENOUS at 08:48

## 2019-12-31 RX ADMIN — PIPERACILLIN SODIUM AND TAZOBACTAM SODIUM SCH MLS/HR: 2; .25 INJECTION, POWDER, LYOPHILIZED, FOR SOLUTION INTRAVENOUS at 19:05

## 2019-12-31 RX ADMIN — IPRATROPIUM BROMIDE AND ALBUTEROL SULFATE SCH ML: .5; 3 SOLUTION RESPIRATORY (INHALATION) at 09:38

## 2019-12-31 NOTE — DS
DATE OF DISCHARGE:  



HOSPITAL COURSE:  A 79-year-old female came in with influenza A and pneumonia

secondary to the influenza, acute respiratory failure, hyperglycemia and the

like.  The patient was placed on aggressive IV antibiotic therapy.  Even though

her chest x-rays showed some possible infiltrative process, she will continue on

Zithromax as an outpatient.  Her white count came down.  Her sodium and

potassium are 144 and 4.3, creatinine ____.  Blood sugars are slightly elevated

at 131.  Severe protein malnutrition there.  Elevated D-dimer.  Imaging:  We can

do that as an outpatient.  We get a V/Q scan as an outpatient and make further

evaluation on her.  She is ambulatory and we will continue to monitor her

accordingly.  She was positive for influenza A and negative for strep pneumonia

antigen.  She was on DVT prophylaxis with the Lovenox.



FINAL DIAGNOSES:  Includes influenza A and pneumonia secondary to influenza A,

organism unspecified, acute respiratory failure, chronic congestive heart

failure, chronic kidney disease stage 3, type 2 diabetes, severe protein

malnutrition, and pseudo-hypocalcemia.  The patient will be on a heart healthy

diet, monitoring her sugars, home health to follow, and activity as tolerated. 

Continue on her other day of Tamiflu.  See the MRAD and the like.  Gave her

other warnings about family situation.





______________________________

SHAYLA DAVALOS MD



DR:  NICO/jayson  JOB#:  [id number] / [id number]

DD:  12/31/2019 09:30  DT:  12/31/2019 10:30

## 2019-12-31 NOTE — RAD
Examination: LUNG VENT/PERFUSION SCAN(VQ)

 

History:  Shortness of breath  

 

Comparison/Correlation: 12/28/2019 AP view of the chest

 

Findings: 20 mCi xenon-133 gas was utilized for ventilation imaging. 

Delayed washout of radiotracer compatible with COPD is noted.

 

4 mCi technetium 99m MAA was intravenously administered for purposes of 

perfusion imaging. Small subsegmental defect present involving the lung 

bases especially on the left lateral projection. No segmental mismatched 

effectively seen.

 

 

Impression:

Low to intermediate probability for pulmonary embolism.

 

Electronically signed by: Kit Das MD (12/31/2019 3:12 PM) [facility]

## 2019-12-31 NOTE — DISCH
DISCHARGE ORDERS


DISCHARGE DATE:  Dec 31, 2019


FINAL DIAGNOSIS


pneumonia/Influenza A


CONDITION AT DISCHARGE:  Stable


Code Status:  DNR/DNI


SNF STAY <30 DAYS:  Yes


HOSPICE:  No


HOSPICE EVALUATE & TREAT:  No


ADMIT TO LTAC:  No


POST DISCHARGE ORDERS:


ACTIVITY ORDERS:  Activity as tolerated


DIET AFTER DISCHARGE:  Cardiac


OTHER ORDERS:  Physical and Occupational Therapy Evaluate & treat





CHECKS AFTER DISCHARGE:


CHECKS AFTER DISCHARGE:  Weigh Yourself Daily


COMMENTS:  incentative spirometer-use hourly while awake





FOLLOW-UP:


PHYSICIAN FOLLOW-UP:  January 7th at 10:45am with Dr Norman





DISCHARGE MEDICATIONS:


Home Meds


Reported Medications


Ipratropium/Albuterol Sulfate (DUONEB 0.5-3(2.5) MG/3 ML) 3 Ml Ampul.neb, 3 ML 

NEB QIDPRN PRN for SOA, EACH


   12/26/19


Diclofenac/Capsicum (Dermacinrx Lexitral Pharmapak) 387 Ml Cmb.sol.cr, 1 ZULMA TP 

QIDPRN PRN for PAIN for 30 Days, #386 ML 0 Refills


   12/26/19


Vit A,C & E/Lutein/Minerals (OCUVITE TABLET) 1 Each Tablet, 1 EACH PO DAILY for 

SUPPLEMENT, TAB


   12/26/19


Calcium Carb & Cit/Vitamin D3 (CALCIUM + D3 ER TABLET) 1 Each Tablet.er, 1 EACH 

PO DAILY for SUPPLEMENT, TAB.SR


   12/26/19


Multivits-Min/Iron/FA/Lutein (Centrum Silver Women Tablet) 1 Each Tablet, 1 EACH

PO DAILY for SUPPLEMENT, TAB


   12/26/19


Pravastatin Sodium (PRAVASTATIN SODIUM) 40 Mg Tablet, 40 MG PO DAILY for 

HYPERLIPIDEMIA, TAB


   12/26/19


Furosemide (LASIX) 20 Mg Tablet, 20 MG PO DAILY for CHF, TAB


   12/26/19


Potassium Chloride (KLOR-CON M20) 20 Meq Tab.er.prt, 20 MEQ PO BID for 

supplement, TAB.SR


   12/26/19


Celecoxib (CELEBREX) 200 Mg Capsule, 200 MG PO 3X/WEEK for arthritis, CAP


   12/26/19


Pantoprazole Sodium (PROTONIX) 40 Mg Tablet., 40 MG PO DAILYAC for gerd, TAB


   12/26/19


Amlodipine Besylate (NORVASC) 10 Mg Tablet, 10 MG PO DAILY for hypertension, TAB


   12/26/19











SHAYLA NORMAN MD           Dec 31, 2019 10:18

## 2019-12-31 NOTE — DISCH
HOME HEALTH DISCHARGE/MEDS


DISCHARGE INFORMATION:


Discharge Date:  Dec 31, 2019


Final Diagnosis:


influenza A/pneumonia


Condition on Discharge:  Stable





CODE STATUS:


Code Status:  DNR/DNI





HOME HEALTH:


Face to Face:


I certify this patient is under my care and that I, or a nurse practitioner or 

physician's assistant working with me, had a face to face encounter that meets 

the physician face to face encounter requirements with this patient on [Date].


Medical Condition(s):  CHF, Pneumonia


Skilled Nursing For:  Assess Cardiopulm Status, Assess & Educate Safety, 

Assess/Skilled Observatio, Medication Management


Physical Therapy For:  Evalulation/Treatment


Occupational Therapy For:  Evaluation/Treatment


Homebound Status Met By:  Unsteady balance w/ amb,





POST DISCHARGE ORDERS:


Activity Instructions for Disc:  Activity as tolerated


DIET AFTER DISCHARGE:  Cardiac





CHECKS AFTER DISCHARGE:


Checks after discharge:  Weigh Yourself Daily


Comment:  incentative spirometer-use hourly while awake





CERTIFICATION STATEMENT:


Certification Statement: Based on the above finding, I certify that this patient

 is confined to the home and needs intermittent skilled nursing care, physical 

therapy and/or speech therapy, or continues to need occupational therapy.~ This 

patient is under my care, and I have initiated the establishment of the plan of 

care.~ This patient will be followed by myself or a community physician who will

 periodically review the plan of care.





DISCHARGE MEDICATIONS:


Home Meds


Reported Medications


Ipratropium/Albuterol Sulfate (DUONEB 0.5-3(2.5) MG/3 ML) 3 Ml Ampul.neb, 3 ML 

NEB QIDPRN PRN for SOA, EACH


   12/26/19


Diclofenac/Capsicum (Dermacinrx Lexitral Pharmapak) 387 Ml Cmb.sol.cr, 1 ZULMA TP 

QIDPRN PRN for PAIN for 30 Days, #386 ML 0 Refills


   12/26/19


Vit A,C & E/Lutein/Minerals (OCUVITE TABLET) 1 Each Tablet, 1 EACH PO DAILY for 

SUPPLEMENT, TAB


   12/26/19


Calcium Carb & Cit/Vitamin D3 (CALCIUM + D3 ER TABLET) 1 Each Tablet.er, 1 EACH 

PO DAILY for SUPPLEMENT, TAB.SR


   12/26/19


Multivits-Min/Iron/FA/Lutein (Centrum Silver Women Tablet) 1 Each Tablet, 1 EACH

 PO DAILY for SUPPLEMENT, TAB


   12/26/19


Pravastatin Sodium (PRAVASTATIN SODIUM) 40 Mg Tablet, 40 MG PO DAILY for 

HYPERLIPIDEMIA, TAB


   12/26/19


Furosemide (LASIX) 20 Mg Tablet, 20 MG PO DAILY for CHF, TAB


   12/26/19


Potassium Chloride (KLOR-CON M20) 20 Meq Tab.er.prt, 20 MEQ PO BID for 

supplement, TAB.SR


   12/26/19


Celecoxib (CELEBREX) 200 Mg Capsule, 200 MG PO 3X/WEEK for arthritis, CAP


   12/26/19


Pantoprazole Sodium (PROTONIX) 40 Mg Tablet.dr, 40 MG PO DAILYAC for gerd, TAB


   12/26/19


Amlodipine Besylate (NORVASC) 10 Mg Tablet, 10 MG PO DAILY for hypertension, TAB


   12/26/19











SHAYLA DAVALOS MD           Dec 31, 2019 09:30

## 2020-01-07 ENCOUNTER — HOSPITAL ENCOUNTER (OUTPATIENT)
Dept: HOSPITAL 63 - 1 SOUTH | Age: 80
Setting detail: OBSERVATION
LOS: 2 days | Discharge: HOME | End: 2020-01-09
Attending: FAMILY MEDICINE | Admitting: FAMILY MEDICINE
Payer: COMMERCIAL

## 2020-01-07 VITALS — BODY MASS INDEX: 36.71 KG/M2 | HEIGHT: 61 IN | WEIGHT: 194.44 LBS

## 2020-01-07 VITALS — SYSTOLIC BLOOD PRESSURE: 168 MMHG | DIASTOLIC BLOOD PRESSURE: 79 MMHG

## 2020-01-07 VITALS — DIASTOLIC BLOOD PRESSURE: 76 MMHG | SYSTOLIC BLOOD PRESSURE: 156 MMHG

## 2020-01-07 VITALS — DIASTOLIC BLOOD PRESSURE: 76 MMHG | SYSTOLIC BLOOD PRESSURE: 150 MMHG

## 2020-01-07 VITALS — SYSTOLIC BLOOD PRESSURE: 121 MMHG | DIASTOLIC BLOOD PRESSURE: 70 MMHG

## 2020-01-07 DIAGNOSIS — R06.03: ICD-10-CM

## 2020-01-07 DIAGNOSIS — I12.9: ICD-10-CM

## 2020-01-07 DIAGNOSIS — J44.1: Primary | ICD-10-CM

## 2020-01-07 DIAGNOSIS — N18.3: ICD-10-CM

## 2020-01-07 LAB
ALBUMIN SERPL-MCNC: 3 G/DL (ref 3.4–5)
ALBUMIN/GLOB SERPL: 0.7 {RATIO} (ref 1–1.7)
ALP SERPL-CCNC: 53 U/L (ref 46–116)
ALT SERPL-CCNC: 49 U/L (ref 14–59)
ANION GAP SERPL CALC-SCNC: 2 MMOL/L (ref 6–14)
AST SERPL-CCNC: 31 U/L (ref 15–37)
BASOPHILS # BLD AUTO: 0.1 X10^3/UL (ref 0–0.2)
BASOPHILS NFR BLD: 1 % (ref 0–3)
BILIRUB SERPL-MCNC: 0.6 MG/DL (ref 0.2–1)
BUN/CREAT SERPL: 12 (ref 6–20)
CA-I SERPL ISE-MCNC: 13 MG/DL (ref 7–20)
CALCIUM SERPL-MCNC: 9 MG/DL (ref 8.5–10.1)
CHLORIDE SERPL-SCNC: 103 MMOL/L (ref 98–107)
CO2 SERPL-SCNC: 35 MMOL/L (ref 21–32)
CREAT SERPL-MCNC: 1.1 MG/DL (ref 0.6–1)
EOSINOPHIL NFR BLD: 0.1 X10^3/UL (ref 0–0.7)
EOSINOPHIL NFR BLD: 1 % (ref 0–3)
ERYTHROCYTE [DISTWIDTH] IN BLOOD BY AUTOMATED COUNT: 14 % (ref 11.5–14.5)
GFR SERPLBLD BASED ON 1.73 SQ M-ARVRAT: 47.9 ML/MIN
GLOBULIN SER-MCNC: 4.4 G/DL (ref 2.2–3.8)
GLUCOSE SERPL-MCNC: 128 MG/DL (ref 70–99)
HCT VFR BLD CALC: 41.2 % (ref 36–47)
HGB BLD-MCNC: 13.4 G/DL (ref 12–15.5)
INFLUENZA A PATIENT: NEGATIVE
INFLUENZA B PATIENT: NEGATIVE
LYMPHOCYTES # BLD: 1.1 X10^3/UL (ref 1–4.8)
LYMPHOCYTES NFR BLD AUTO: 12 % (ref 24–48)
MCH RBC QN AUTO: 30 PG (ref 25–35)
MCHC RBC AUTO-ENTMCNC: 33 G/DL (ref 31–37)
MCV RBC AUTO: 92 FL (ref 79–100)
MONO #: 0.5 X10^3/UL (ref 0–1.1)
MONOCYTES NFR BLD: 5 % (ref 0–9)
NEUT #: 7.4 X10^3UL (ref 1.8–7.7)
NEUTROPHILS NFR BLD AUTO: 81 % (ref 31–73)
PLATELET # BLD AUTO: 157 X10^3/UL (ref 140–400)
POTASSIUM SERPL-SCNC: 4.1 MMOL/L (ref 3.5–5.1)
PROT SERPL-MCNC: 7.4 G/DL (ref 6.4–8.2)
RBC # BLD AUTO: 4.47 X10^6/UL (ref 3.5–5.4)
SODIUM SERPL-SCNC: 140 MMOL/L (ref 136–145)
WBC # BLD AUTO: 9.1 X10^3/UL (ref 4–11)

## 2020-01-07 PROCEDURE — 83880 ASSAY OF NATRIURETIC PEPTIDE: CPT

## 2020-01-07 PROCEDURE — 97166 OT EVAL MOD COMPLEX 45 MIN: CPT

## 2020-01-07 PROCEDURE — 80048 BASIC METABOLIC PNL TOTAL CA: CPT

## 2020-01-07 PROCEDURE — 82550 ASSAY OF CK (CPK): CPT

## 2020-01-07 PROCEDURE — 96376 TX/PRO/DX INJ SAME DRUG ADON: CPT

## 2020-01-07 PROCEDURE — 85610 PROTHROMBIN TIME: CPT

## 2020-01-07 PROCEDURE — 85025 COMPLETE CBC W/AUTO DIFF WBC: CPT

## 2020-01-07 PROCEDURE — G0378 HOSPITAL OBSERVATION PER HR: HCPCS

## 2020-01-07 PROCEDURE — 97110 THERAPEUTIC EXERCISES: CPT

## 2020-01-07 PROCEDURE — 71046 X-RAY EXAM CHEST 2 VIEWS: CPT

## 2020-01-07 PROCEDURE — 84484 ASSAY OF TROPONIN QUANT: CPT

## 2020-01-07 PROCEDURE — 71045 X-RAY EXAM CHEST 1 VIEW: CPT

## 2020-01-07 PROCEDURE — 87804 INFLUENZA ASSAY W/OPTIC: CPT

## 2020-01-07 PROCEDURE — 96374 THER/PROPH/DIAG INJ IV PUSH: CPT

## 2020-01-07 PROCEDURE — 97162 PT EVAL MOD COMPLEX 30 MIN: CPT

## 2020-01-07 PROCEDURE — 76942 ECHO GUIDE FOR BIOPSY: CPT

## 2020-01-07 PROCEDURE — 97116 GAIT TRAINING THERAPY: CPT

## 2020-01-07 PROCEDURE — 80053 COMPREHEN METABOLIC PANEL: CPT

## 2020-01-07 PROCEDURE — 93005 ELECTROCARDIOGRAM TRACING: CPT

## 2020-01-07 PROCEDURE — G0379 DIRECT REFER HOSPITAL OBSERV: HCPCS

## 2020-01-07 PROCEDURE — 94640 AIRWAY INHALATION TREATMENT: CPT

## 2020-01-07 PROCEDURE — 36415 COLL VENOUS BLD VENIPUNCTURE: CPT

## 2020-01-07 PROCEDURE — 71250 CT THORAX DX C-: CPT

## 2020-01-07 PROCEDURE — 85730 THROMBOPLASTIN TIME PARTIAL: CPT

## 2020-01-07 PROCEDURE — 84145 PROCALCITONIN (PCT): CPT

## 2020-01-07 RX ADMIN — Medication SCH CAP: at 19:53

## 2020-01-07 RX ADMIN — IPRATROPIUM BROMIDE AND ALBUTEROL SULFATE PRN ML: .5; 3 SOLUTION RESPIRATORY (INHALATION) at 19:54

## 2020-01-07 RX ADMIN — POTASSIUM CHLORIDE SCH MEQ: 1500 TABLET, EXTENDED RELEASE ORAL at 19:54

## 2020-01-07 RX ADMIN — ATORVASTATIN CALCIUM SCH MG: 10 TABLET, FILM COATED ORAL at 19:54

## 2020-01-07 NOTE — NUR
The patient, TAMY VILLAREAL, 78 y/o, F admitted by SHAYLA DAVALOS MD, was given written 
information regarding hospital policies, unit procedures and contact persons.  



Valuables were checked and left with patient at bedside. Patient's niece Cece was present 
at bedside at the time of admission. Patient was a direct admit with an admission of Pleural 
Effusion. Patient came from [facility]. Patient is alert and oriented x 4, 
speech is clear, able to make wants and needs known and able to verbalize understanding of 
others. Patient is cooperative with staff in all cares. Patient is a x1 assist with gait 
belt and walker with transfers from wheel chair to bed. Patient is continent of bowel and 
bladder. Lungs are inspiratory and expiratory crackles, RA 02 stats were 84%. Patient placed 
on 02 via NC @ 3L. Respirations were equal, labored. No cough present, patient complained of 
SOB. BLE edema present, +2-3. Abdomen is obese, non tender. Active bowel sounds in all 4 
quadrants. Skin is fragile, appropriate for ethnicity and age.  notified of patient 
arrival. New orders to be put in by . Patient is now resting in bed with call light with 
in reach.

## 2020-01-08 VITALS — SYSTOLIC BLOOD PRESSURE: 145 MMHG | DIASTOLIC BLOOD PRESSURE: 75 MMHG

## 2020-01-08 VITALS — SYSTOLIC BLOOD PRESSURE: 135 MMHG | DIASTOLIC BLOOD PRESSURE: 78 MMHG

## 2020-01-08 VITALS — SYSTOLIC BLOOD PRESSURE: 152 MMHG | DIASTOLIC BLOOD PRESSURE: 74 MMHG

## 2020-01-08 VITALS — SYSTOLIC BLOOD PRESSURE: 144 MMHG | DIASTOLIC BLOOD PRESSURE: 69 MMHG

## 2020-01-08 VITALS — DIASTOLIC BLOOD PRESSURE: 56 MMHG | SYSTOLIC BLOOD PRESSURE: 149 MMHG

## 2020-01-08 LAB
ANION GAP SERPL CALC-SCNC: 2 MMOL/L (ref 6–14)
CA-I SERPL ISE-MCNC: 13 MG/DL (ref 7–20)
CALCIUM SERPL-MCNC: 8.3 MG/DL (ref 8.5–10.1)
CHLORIDE SERPL-SCNC: 104 MMOL/L (ref 98–107)
CO2 SERPL-SCNC: 38 MMOL/L (ref 21–32)
CREAT SERPL-MCNC: 1.1 MG/DL (ref 0.6–1)
GFR SERPLBLD BASED ON 1.73 SQ M-ARVRAT: 47.9 ML/MIN
GLUCOSE SERPL-MCNC: 93 MG/DL (ref 70–99)
POTASSIUM SERPL-SCNC: 3.9 MMOL/L (ref 3.5–5.1)
SODIUM SERPL-SCNC: 144 MMOL/L (ref 136–145)

## 2020-01-08 RX ADMIN — Medication SCH CAP: at 20:17

## 2020-01-08 RX ADMIN — POTASSIUM CHLORIDE SCH MEQ: 1500 TABLET, EXTENDED RELEASE ORAL at 08:24

## 2020-01-08 RX ADMIN — Medication SCH CAP: at 08:24

## 2020-01-08 RX ADMIN — POTASSIUM CHLORIDE SCH MEQ: 1500 TABLET, EXTENDED RELEASE ORAL at 20:17

## 2020-01-08 RX ADMIN — IPRATROPIUM BROMIDE AND ALBUTEROL SULFATE PRN ML: .5; 3 SOLUTION RESPIRATORY (INHALATION) at 22:24

## 2020-01-08 RX ADMIN — PANTOPRAZOLE SODIUM SCH MG: 40 TABLET, DELAYED RELEASE ORAL at 08:24

## 2020-01-08 RX ADMIN — FUROSEMIDE SCH MG: 10 INJECTION, SOLUTION INTRAMUSCULAR; INTRAVENOUS at 10:19

## 2020-01-08 RX ADMIN — ATORVASTATIN CALCIUM SCH MG: 10 TABLET, FILM COATED ORAL at 20:17

## 2020-01-08 RX ADMIN — I-VITE, TAB 1000-60-2MG (60/BT) SCH TAB: TAB at 08:24

## 2020-01-08 NOTE — RAD
Examination: CT CHEST WO CONTRAST

 

History: Left lower lobe opacification on 2 view chest x-ray exam 

performed earlier. Possibly of masses questioned.

 

Comparison/Correlation: Frontal and lateral views of the chest 1/8/2020

 

Findings: Axial images of the chest were obtained without contrast. 

Sagittal and coronal reformatted images were provided.

 

Axial images of the chest were obtained without contrast. Sagittal and 

coronal reformatted images were provided. Mild dilated cardiomegaly 

present. Calcified involvement about the mitral annulus noted. Marked left

lower lobe atelectatic consolidation is evident. Moderate left pleural 

effusion is present. Right small right pleural effusion is suggested with 

minimal adjacent atelectasis. Minimal right costophrenic sulcus 

atelectasis.

 

Tracheal bronchial tree calcification noted. No endobronchial mass lesion 

delineated. Luminal narrowing of the left lower lobe bronchus is noted 

diffusely. No pneumothorax.

 

Cholecystectomy noted. Large cystic lesion present subjacent to the liver 

which may represent a right renal cyst is present. This process is not 

fully included for purposes of this exam. Right kidney is not visualized 

on this exam. The cystic structure measures at least 10.2 cm x 10.9 cm in 

the axial plane.

 

There is a 1 cm diameter low-attenuation lesion at the hepatic dome 

posteriorly. Low-attenuation lesion is too small characterize and 

posterior liver measuring 1.1 cm in diameter on axial image 57 of series 

2. These are of indeterminate significance.

 

Exaggerated kyphosis of the low cervical and upper thoracic spine noted. 

This may in part be positional.

 

 

Impression:

Near complete left lower lobe atelectasis. Moderate-sized left pleural 

effusion. Follow-up to resolution should recommended. Mass is not 

delineated on this exam but evaluation is limited without IV contrast and 

with significant atelectasis present.

 

Large cystic structure involving the subhepatic region. This probably is 

related to a right renal cyst. Indeterminate hepatic dome lesion and an 

additional low-attenuation more inferiorly located right hepatic lobe 

lesion is present. Correlate with prior exams if available. If no prior 

exams are available, further evaluation with CT of the abdomen and pelvis 

with contrast is recommended.

 

PQRS Compliance Statement:

 

One or more of the following individualized dose reduction techniques were

utilized for this examination:  

1. Automated exposure control  

2. Adjustment of the mA and/or kV according to patient size  

3. Use of iterative reconstruction technique

 

Electronically signed by: Kit Das MD (1/8/2020 2:44 PM) [facility]

## 2020-01-08 NOTE — EKG
[facility] [address], [place] [address]

Test Date:    2020               Test Time:    05:11:18

Pat Name:     TAMY VILLAREAL            Department:   

Patient ID:   [id number]           Room:         117 A

Gender:       F                        Technician:   

:          [id number]               Requested By: SHAYLA DAVALOS

Order Number: [id number]            Reading MD:     

                                 Measurements

Intervals                              Axis          

Rate:         70                       P:            146

AR:           174                      QRS:          -34

QRSD:         102                      T:            69

QT:           400                                    

QTc:          435                                    

                           Interpretive Statements

SINUS RHYTHM

ABNORMAL LEFT AXIS DEVIATION

LEFT ANTERIOR FASCICULAR BLOCK

T ABNORMALITY IN ANTERIOR LEADS

ABNORMAL ECG

RI6.02

No previous ECG available for comparison

## 2020-01-08 NOTE — RAD
EXAM: Chest, 2 views.

 

HISTORY: Shortness of breath.

 

COMPARISON: 12/28/2019

 

FINDINGS: 2 views of the chest are obtained. There is stable suspected 

small left greater than right pleural effusions with partial lingular and 

left lower lobe consolidation. There is no pneumothorax. There is a stable

cardiac silhouette.

 

IMPRESSION: Stable suspected small left greater than right pleural 

effusions with lingular and left lower lobe partial consolidation.

 

Electronically signed by: Priscilla Khan MD (1/8/2020 10:19 AM) [place] [id number]

## 2020-01-08 NOTE — PN
DATE:  



SUBJECTIVE:  She seems to be breathing a little bit better now than she has

been.  The patient had a pleural effusion and evaluation of that is being

undertaken.  She has been placed on IV Lasix to help her with that.  Blood

pressure down to 149/56, respiratory rate 22, pulse 70, afebrile.  The patient

seems to be breathing better than she has been.  She is on breathing treatments,

aggressive pulmonary toilet and will make further evaluation on her as

indicated.  We will go ahead and monitor her accordingly.



OBJECTIVE:

VITAL SIGNS:  Blood pressure 149/56, respiratory rate 22, pulse 70, afebrile.

GENERAL:  The patient is alert and oriented.

LUNGS:  Diminished, primarily in the left lower lobe.

CARDIOVASCULAR:  Regular sinus rhythm.

ABDOMEN:  Protuberant, soft, nontender, no rebounding, no guarding.

EXTREMITIES:  No clubbing, cyanosis, nor edema.

NEUROLOGIC:  Intact.



IMPRESSION:  Left lower lobe pleural effusion, essential hypertension.



PLAN:  Continue to monitor patient accordingly, make further evaluation on her

as indicated.  We will get a CAT scan of that pleural effusion make further

adjustments after that has been completed.





______________________________

SHAYLA DAVALOS MD



DR:  NICO/jayson  JOB#:  [id number] / [id number]

DD:  01/08/2020 10:07  DT:  01/08/2020 23:31

## 2020-01-08 NOTE — NUR
Patient is alert and oriented x4.  No complaints at this time.  Lung sounds are better since 
last admission, extremely coarse last admission, today wheezes.  Have been able to titrate 
patient down to RA.  Pt up independently.  WCTM.

## 2020-01-09 VITALS — DIASTOLIC BLOOD PRESSURE: 68 MMHG | SYSTOLIC BLOOD PRESSURE: 122 MMHG

## 2020-01-09 VITALS — DIASTOLIC BLOOD PRESSURE: 74 MMHG | SYSTOLIC BLOOD PRESSURE: 149 MMHG

## 2020-01-09 VITALS — DIASTOLIC BLOOD PRESSURE: 71 MMHG | SYSTOLIC BLOOD PRESSURE: 117 MMHG

## 2020-01-09 LAB
BASOPHILS # BLD AUTO: 0.1 X10^3/UL (ref 0–0.2)
BASOPHILS NFR BLD: 1 % (ref 0–3)
EOSINOPHIL NFR BLD: 0.1 X10^3/UL (ref 0–0.7)
EOSINOPHIL NFR BLD: 1 % (ref 0–3)
ERYTHROCYTE [DISTWIDTH] IN BLOOD BY AUTOMATED COUNT: 13.7 % (ref 11.5–14.5)
HCT VFR BLD CALC: 37.4 % (ref 36–47)
HGB BLD-MCNC: 12.5 G/DL (ref 12–15.5)
LYMPHOCYTES # BLD: 0.8 X10^3/UL (ref 1–4.8)
LYMPHOCYTES NFR BLD AUTO: 12 % (ref 24–48)
MCH RBC QN AUTO: 30 PG (ref 25–35)
MCHC RBC AUTO-ENTMCNC: 33 G/DL (ref 31–37)
MCV RBC AUTO: 89 FL (ref 79–100)
MONO #: 0.5 X10^3/UL (ref 0–1.1)
MONOCYTES NFR BLD: 7 % (ref 0–9)
NEUT #: 5.2 X10^3UL (ref 1.8–7.7)
NEUTROPHILS NFR BLD AUTO: 79 % (ref 31–73)
PLATELET # BLD AUTO: 158 X10^3/UL (ref 140–400)
RBC # BLD AUTO: 4.21 X10^6/UL (ref 3.5–5.4)
WBC # BLD AUTO: 6.6 X10^3/UL (ref 4–11)

## 2020-01-09 RX ADMIN — FUROSEMIDE SCH MG: 10 INJECTION, SOLUTION INTRAMUSCULAR; INTRAVENOUS at 07:40

## 2020-01-09 RX ADMIN — I-VITE, TAB 1000-60-2MG (60/BT) SCH TAB: TAB at 07:39

## 2020-01-09 RX ADMIN — PANTOPRAZOLE SODIUM SCH MG: 40 TABLET, DELAYED RELEASE ORAL at 07:39

## 2020-01-09 RX ADMIN — POTASSIUM CHLORIDE SCH MEQ: 1500 TABLET, EXTENDED RELEASE ORAL at 07:40

## 2020-01-09 RX ADMIN — Medication SCH CAP: at 07:39

## 2020-01-09 NOTE — RAD
Ultrasound guided left thoracentesis.

 

History: Pleural effusion.

 

Technique: After discussing the risk and benefits of the procedure, the 

patient signed a written consent form for ultrasound guided thoracentesis 

of left pleural effusion. Appropriate relevant laboratory findings were 

reviewed prior to the exam. Initial ultrasound examination of the left 

hemithorax demonstrated the presence of a left pleural effusion, 

interspersed smaller pockets of fluid present. External skin site was 

prepped and draped in the usual sterile fashion at the planned site of 

access. ChloraPrep was utilized for cleansing solution. 1% percent 

lidocaine was utilized for local anesthesia. 5 Urdu Yueh needle was 

advanced at site of ultrasound localization. There was initial aspiration 

of a small quantity of about 3 cc of blood-tinged fluid or blood, pliable 

catheter advanced over the needle into the expected pleural space. However

no additional fluid could be removed. Therefore the catheter was removed. 

Patient stated was feeling dizzy, therefore no further attempts were made 

to access the pleural fluid. Patient is to have chest radiograph after the

procedure. 

 

Findings: Initial aspiration yielded only a small quantity of blood-tinged

fluid or blood.

 

Impression: Ultrasound-guided left thoracentesis was attempted.  As the 

patient reported dizziness, procedure was stopped and patient sent back to

room.

 

Electronically signed by: Lam Gilliam MD (1/9/2020 1:22 PM) [id number]

## 2020-01-09 NOTE — DISCH
HOME HEALTH DISCHARGE/MEDS


DISCHARGE INFORMATION:


Discharge Date:  Jan 9, 2020


Final Diagnosis:


hypoxia,COPD,pleural effusion


Condition on Discharge:  Stable





CODE STATUS:


Code Status:  Full





HOME HEALTH:


Face to Face:


I certify this patient is under my care and that I, or a nurse practitioner or 

physician's assistant working with me, had a face to face encounter that meets 

the physician face to face encounter requirements with this patient on [Date].


Medical Condition(s):  COPD


Physical Therapy For:  Evalulation/Treatment


Occupational Therapy For:  Evaluation/Treatment





POST DISCHARGE ORDERS:


Activity Instructions for Disc:  Activity as tolerated


DIET AFTER DISCHARGE:  Cardiac





CHECKS AFTER DISCHARGE:


Checks after discharge:  Check blood press - daily, Weigh Yourself Daily





TREATMENT/EQUIPMENT ORDERS:


Discharge Respiratory Equipmen:  Oxygen





CERTIFICATION STATEMENT:


Certification Statement: Based on the above finding, I certify that this patient

 is confined to the home and needs intermittent skilled nursing care, physical 

therapy and/or speech therapy, or continues to need occupational therapy.~ This 

patient is under my care, and I have initiated the establishment of the plan of 

care.~ This patient will be followed by myself or a community physician who will

 periodically review the plan of care.





DISCHARGE MEDICATIONS:


Home Meds


Active Scripts


Azithromycin (ZITHROMAX TRI-ROBEL) 500 Mg Tablet, 1 TAB PO DAILY for infection, #3

 TAB


   Prov:SHAYLA DAVALOS MD         12/31/19


Lactobacillus Rhamnosus Gg (CULTURELLE) 1 Each Cap.sprink, 1 CAP PO BID for 

probiotic for 30 Days, #60 CAP


   Prov:SHAYLA DAVALOS MD         12/31/19


Guaifenesin (MUCINEX) 600 Mg Tablet.er, 600 MG PO BID for chest congestion for 

30 Days, #60 TAB.SR


   Prov:SHAYLA DAVALOS MD         12/31/19


Guaifenesin/Dextromethorphan (GUAIFENESIN DM SYRUP) 5 Ml Syrup, 10 ML PO PRN 

Q6HRS PRN for COUGH, #1 MISC


   Use as directed on bottle


   Prov:SHAYLA DAVALOS MD         12/31/19


Oseltamivir Phosphate (TAMIFLU) 75 Mg Capsule, 75 MG PO BID for influenza for 1 

Day, #2 CAP


   Prov:SHAYLA DAVALOS MD         12/31/19


Furosemide (LASIX) 20 Mg Tablet, 20 MG PO DAILY for CHF, #90 TAB


   Increase dosage to 40mg until seen by physician


   Prov:SHAYLA DAVALOS MD         12/31/19


Reported Medications


Ipratropium/Albuterol Sulfate (DUONEB 0.5-3(2.5) MG/3 ML) 3 Ml Ampul.neb, 3 ML 

NEB QIDPRN PRN for SOA, EACH


   12/26/19


Diclofenac/Capsicum (Dermacinrx Lexitral [place]) 387 Ml Cmb.sol.cr, 1 ZULMA TP 

QIDPRN PRN for PAIN for 30 Days, #386 ML 0 Refills


   12/26/19


Vit A,C & E/Lutein/Minerals (OCUVITE TABLET) 1 Each Tablet, 1 EACH PO DAILY for 

SUPPLEMENT, TAB


   12/26/19


Calcium Carb & Cit/Vitamin D3 (CALCIUM + D3 ER TABLET) 1 Each Tablet.er, 1 EACH 

PO DAILY for SUPPLEMENT, TAB.SR


   12/26/19


Multivits-Min/Iron/FA/Lutein (Centrum Silver Women Tablet) 1 Each Tablet, 1 EACH

 PO DAILY for SUPPLEMENT, TAB


   12/26/19


Pravastatin Sodium (PRAVASTATIN SODIUM) 40 Mg Tablet, 40 MG PO DAILY for 

HYPERLIPIDEMIA, TAB


   12/26/19


Potassium Chloride (KLOR-CON M20) 20 Meq Tab.er.prt, 20 MEQ PO BID for 

supplement, TAB.SR


   12/26/19


Pantoprazole Sodium (PROTONIX) 40 Mg Tablet.dr, 40 MG PO DAILYAC for gerd, TAB


   12/26/19











SHAYLA DAVALOS MD            Jan 9, 2020 15:04

## 2020-01-09 NOTE — NUR
Discharge Note:



[facility]



Discharge instructions and discharge home medications reviewed with Patient and a copy 
given. All questions have been answered and understanding verbalized. 



The following instructions and handouts were given: pleural effusion 



Discontinued lines and drains:IV IN RIGHT WRIST D/C, CATHETER TIP IN TACT 



Patient discharged to home with niece. Oxygen and home health care services set up at the 
time of discharge.

## 2020-01-09 NOTE — NUR
Consent for thoracentesis obtained by this writer from patient. Patient verbally verified 
education received pertaining to reason for thoracentesis, risks and benefits.

## 2020-01-09 NOTE — RAD
PORTABLE CHEST 1V

 

History: Post attempted thoracentesis

 

Comparison: January 8, 2020

 

Findings:

Single view of the chest is submitted. 

Pericardial cardiac silhouette is again enlarged. Size of left pleural 

effusion is unchanged with adjacent airspace opacity. No pneumothorax is 

identified. There is no right pleural fluid. There is atherosclerotic 

calcification near aortic arch.

 

Impression: 

 

1.  Radiographic findings are unchanged, no pneumothorax. There is again 

left pleural effusion with adjacent atelectasis/infiltrate.

 

Electronically signed by: Lam Gilliam MD (1/9/2020 1:55 PM) [facility]

## 2020-01-09 NOTE — NUR
New order for thoracentesis received  from Dr. Norman. PT/INR, PTT ,CBC order received 
from  Thoracentesis will be done pending results of CBC, PT/INR, CBC. Patient notified 
verbally.

## 2020-01-10 NOTE — DS
DATE OF DISCHARGE:  01/09/2020



HOSPITAL COURSE:  The patient was admitted through the office with increased

shortness of breath and exacerbation of her COPD.  She also had a marked pleural

effusion.  The patient's oxygen saturation had dropped down in the low 80s and

the patient had increased respiratory effort.  The patient was noted to have a

left pleural effusion with also exacerbation of her COPD.  The patient made good

progress with aggressive pulmonary toilet as well as a tap performed by the

Radiology team.  The patient made a good progress during the rest of her

hospitalization and there were no complications.



IMPRESSION:  Acute exacerbation of the chronic obstructive pulmonary disease

with acute bronchitis and that of a pleural effusion of left lower lobe,

essential hypertension, dyspnea, acute respiratory distress, hypoxia, moderate

protein malnutrition, and chronic kidney disease stage 3.



Continue to monitor the patient and accordingly make further evaluation on her

as an outpatient, see MRAD, decreased activity, and make further evaluation per

those situations.  She will be on a heart healthy diet.





______________________________

SHAYLA DAVALOS MD



DR:  NICO/jayson  JOB#:  [id number] / [id number]

DD:  01/10/2020 17:22  DT:  01/10/2020 19:37

## 2021-05-16 ENCOUNTER — HOSPITAL ENCOUNTER (EMERGENCY)
Dept: HOSPITAL 63 - ER | Age: 81
Discharge: HOME | End: 2021-05-16
Payer: COMMERCIAL

## 2021-05-16 VITALS — BODY MASS INDEX: 36.84 KG/M2 | HEIGHT: 61 IN | WEIGHT: 195.11 LBS

## 2021-05-16 VITALS — SYSTOLIC BLOOD PRESSURE: 176 MMHG | DIASTOLIC BLOOD PRESSURE: 71 MMHG

## 2021-05-16 DIAGNOSIS — Z88.2: ICD-10-CM

## 2021-05-16 DIAGNOSIS — J44.9: ICD-10-CM
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
## 2021-05-16 DIAGNOSIS — Z88.1: ICD-10-CM

## 2021-05-16 DIAGNOSIS — Z90.49: ICD-10-CM
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
## 2021-05-16 DIAGNOSIS — Z88.8: ICD-10-CM

## 2021-05-16 DIAGNOSIS — I10: ICD-10-CM
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
## 2021-05-16 DIAGNOSIS — M21.242: Primary | ICD-10-CM

## 2021-05-16 PROCEDURE — 73130 X-RAY EXAM OF HAND: CPT

## 2021-05-16 PROCEDURE — 72125 CT NECK SPINE W/O DYE: CPT

## 2021-05-16 PROCEDURE — 70450 CT HEAD/BRAIN W/O DYE: CPT

## 2021-05-16 NOTE — RAD
Left hand 3 views.



HISTORY: Fall, left middle finger injury



3 views were taken the left hand. There is flexion deformity of the middle finger. Extensor tendon in
jury can have this pattern. A fracture is not definitively identified.



IMPRESSION:

1. Flexion deformity of the left third finger.

2. A definite fracture is not identified.



Electronically signed by: Faustino Jauregui MD (5/16/2021 1:31 PM) [facility]

## 2021-05-16 NOTE — RAD
Exam performed: CT scan of the head and cervical spine without contrast. 



Date of Service:5/16/2021 20 Comparison:None available .



Clinical History: Fall 



Technique: Helical acquisitions are obtained from the foramen magnum to the vertex and through the ce
rvical spine without IV contrast.  Sagittal and coronal reformatted images are  obtained and reviewed
.  



CT head findings:



The ventricles are midline without evidence of dilatation. Normal gray-white differentiation is maint
ained. There is diffuse cortical atrophy. Areas of low-attenuation in both periventricular and subcor
tical deep white matter suggesting small vessel ischemic changes are seen. There is no extra axial fl
uid collection, intraparenchymal hemorrhage or mass lesion.  The visualized portions of the orbits, p
aranasal sinuses and the mastoid air cells appear clear.  The calvarium is intact.  



Impression:

1. No acute intracranial process detected.

2. Age-related atrophy and bilateral periventricular small vessel ischemic changes are noted.



End impression



CT cervical spine findings:



Reversal of cervical curvature likely positional. There is minimal grade 1 anterolisthesis of C3 over
 C4 and C4 over C5. The craniocervical and C1-2 articulation appears maintained. The vertebral body h
eights are maintained. There is narrowing of several intervertebral disc spaces with diffuse osteophy
tic spurring centered mainly at C5-C7 intravertebral disc spaces are maintained.  No prevertebral sof
t tissue swelling is identified. There are no fractures.  No definite lymphadenopathy or masses are s
een within the neck. Thyroid nodules involving the right lobe and isthmus.



Impression:



Spondylotic changes and multilevel disc degenerative changes involving the cervical spine.

No acute abnormality seen in the CT scan cervical spine.   



PQRS Compliance Statement:



One or more of the following individualized dose reduction techniques were utilized for this examinat
ion:  

1. Automated exposure control  

2. Adjustment of the mA and/or kV according to patient size  

3. Use of iterative reconstruction technique



Electronically signed by: Constanza Forbes MD (5/16/2021 3:28 PM) [facility]

## 2021-05-16 NOTE — PHYS DOC
Past History


Past Medical History:  COPD, Hypertension


Past Surgical History:  Appendectomy, Cholecystectomy


Alcohol Use:  None





General Adult


EDM:


Chief Complaint:  FINGER INJURY





HPI:


HPI:





Patient is a 80-year-old female who presents with left, middle finger injury.  

Patient states that she was leaving [employer] when she tripped over a rug in her 

middle finger was pushed back towards the palm of her hand.  Denies taking 

anything for pain prior to arrival.  Patient denies dizziness before the fall.  

 Patient is unable to move her finger.  Patient denies pain in her wrist and has

full range of motion.  Patient denies hitting her head or loss of consciousness.





Review of Systems:


Review of Systems:





Respiratory:  Denies cough or shortness of breath 


Cardiovascular:  Denies chest pain or edema 


GI:  Denies abdominal pain, nausea, vomiting, bloody stools or diarrhea 


: Denies dysuria 


Musculoskeletal: Reports left, middle finger pain


Integument:  Denies rash 


Neurologic:  Denies headache, focal weakness or sensory changes 


Endocrine:  Denies polyuria or polydipsia 


Lymphatic:  Denies swollen glands 


Psychiatric:  Denies depression or anxiety





Allergies:


Allergies:





Allergies








Coded Allergies Type Severity Reaction Last Updated Verified


 


  Sulfa (Sulfonamide Antibiotics) Allergy Unknown  12/26/19 Yes


 


  ibuprofen Allergy Unknown  5/16/21 Yes


 


  levofloxacin Allergy Unknown  12/26/19 Yes











Physical Exam:


PE:





Constitutional: Well developed, well nourished, no acute distress, non-toxic 

appearance. []


HENT: Normocephalic, atraumatic, bilateral external ears normal, oropharynx 

moist, no oral exudates, nose normal. []


Eyes: PERRLA, EOMI, conjunctiva normal, no discharge. [] 


Neck: Normal range of motion, no tenderness, supple, no stridor. [] 


Cardiovascular:Heart rate regular rhythm, no murmur []


Lungs & Thorax:  Bilateral breath sounds clear to auscultation []


Abdomen: Bowel sounds normal, soft, no tenderness, no masses, no pulsatile 

masses. [] 


Skin: Left, middle finger, bruising, swelling


Back: No tenderness, no CVA tenderness. [] 


Extremities: No tenderness to left arm or wrist, no cyanosis, no clubbing, ROM 

intact, no edema. [] 


Neurologic: Alert and oriented X 3, normal motor function, normal sensory 

function, no focal deficits noted. []


Psychologic: Affect normal, judgement normal, mood normal. []





Current Patient Data:


Vital Signs:





                                   Vital Signs








  Date Time  Temp Pulse Resp B/P (MAP) Pulse Ox O2 Delivery O2 Flow Rate FiO2


 


5/16/21 12:27 97.8 85 16 187/69 (108) 98 Room Air  











EKG:


EKG:


[]





Radiology/Procedures:


Radiology/Procedures:


[]Left hand 3 views.





HISTORY: Fall, left middle finger injury





3 views were taken the left hand. There is flexion deformity of the middle 

finger. Extensor tendon injury can have this pattern. A fracture is not 

definitively identified.





IMPRESSION:


1. Flexion deformity of the left third finger.


2. A definite fracture is not identified.





Electronically signed by: Faustino Jauregui MD (5/16/2021 1:31 PM) [facility]


 

********************************************************************************


****************************************************************************


Exam performed: CT scan of the head and cervical spine without contrast. 





Date of Service:5/16/2021 20 Comparison:None available .





Clinical History: Fall 





Technique: Helical acquisitions are obtained from the foramen magnum to the 

vertex and through the cervical spine without IV contrast.  Sagittal and coronal

 reformatted images are  obtained and reviewed.  





CT head findings:





The ventricles are midline without evidence of dilatation. Normal gray-white 

differentiation is maintained. There is diffuse cortical atrophy. Areas of low-

attenuation in both periventricular and subcortical deep white matter suggesting

 small vessel ischemic changes are seen. There is no extra axial fluid addison

ection, intraparenchymal hemorrhage or mass lesion.  The visualized portions of 

the orbits, paranasal sinuses and the mastoid air cells appear clear.  The 

calvarium is intact.  





Impression:


1. No acute intracranial process detected.


2. Age-related atrophy and bilateral periventricular small vessel ischemic 

changes are noted.





End impression





CT cervical spine findings:





Reversal of cervical curvature likely positional. There is minimal grade 1 

anterolisthesis of C3 over C4 and C4 over C5. The craniocervical and C1-2 

articulation appears maintained. The vertebral body heights are maintained. 

There is narrowing of several intervertebral disc spaces with diffuse 

osteophytic spurring centered mainly at C5-C7 intravertebral disc spaces are 

maintained.  No prevertebral soft tissue swelling is identified. There are no 

fractures.  No definite lymphadenopathy or masses are seen within the neck. 

Thyroid nodules involving the right lobe and isthmus.





Impression:





Spondylotic changes and multilevel disc degenerative changes involving the 

cervical spine.


No acute abnormality seen in the CT scan cervical spine.   





PQRS Compliance Statement:





One or more of the following individualized dose reduction techniques were 

utilized for this examination:  


1. Automated exposure control  


2. Adjustment of the mA and/or kV according to patient size  


3. Use of iterative reconstruction technique





Electronically signed by: Constanza Forbes MD (5/16/2021 3:28 PM) [facility]














Heart Score:


C/O Chest Pain:  No


Risk Factors:


Risk Factors:  DM, Current or recent (<one month) smoker, HTN, HLP, family hi

story of CAD, obesity.


Risk Scores:


Score 0 - 3:  2.5% MACE over next 6 weeks - Discharge Home


Score 4 - 6:  20.3% MACE over next 6 weeks - Admit for Clinical Observation


Score 7 - 10:  72.7% MACE over next 6 weeks - Early Invasive Strategies





Course & Med Decision Making:


Course & Med Decision Making


Pertinent Labs and Imaging studies reviewed. (See chart for details)





[] 80-year-old female presents with a fall after tripping on a rug.  Patient 

fell out on her left hand and pushed her left, middle finger back towards the 

palm of her hand.  Patient is unable to move her left finger.  Sensation is 

intact.  X-ray ordered of her left hand to rule out fracture.  Patient's finger 

is swollen, bruising.  Patient given hydrocodone for pain. Flexion deformity of 

the left third finger.  X-ray of hand is negative for fracture.  Splint applied 

to finger.  CT of head and neck was also ordered to rule out intracranial 

bleeding or fracture due to fall.  CT of head and neck was negative for any 

acute abnormalities.  Patient given strict return precautions.  Instructed to 

follow-up with PCP in the next couple of days.  Patient is appreciative and okay

 with discharge plan.





Dragon Disclaimer:


Dragon Disclaimer:


This electronic medical record was generated, in whole or in part, using a voice

 recognition dictation system.





Departure


Departure:


Impression:  


   Primary Impression:  


   Flexion deformity of finger joint of left hand


Disposition:  01 HOME / SELF CARE / HOMELESS


Condition:  STABLE


Referrals:  


SHAYLA DAVALOS MD (PCP)


Patient Instructions:  Finger Dislocation, Easy-to-Read





Additional Instructions:  


You were seen in the emergency room for finger pain after a fall.  X-ray of your

 left hand was negative for fracture.  CT of your head and neck were negative 

for bleeding or fractures.  Please follow-up with your PCP in the next couple of

 days for further management.  Going to send you home with some pain medication 

until you are able to follow-up with your PCP.  Please return to the emergency 

room with worsening symptoms or concerns.





EMERGENCY DEPARTMENT GENERAL DISCHARGE INSTRUCTIONS





Thank you for coming to [facility] (ED) today and trusting us

 with you 


care.  We trust that you had a positivie experience in our Emergency Department.

  If you 


wish to speak to the department management, you may call the director at 

[phone].





YOUR FOLLOW UP INSTRUCTIONS ARE AS FOLLOWS:





1.  Do you have a private Doctor?  If you do not have a private doctor, please 

ask for a 


resource list of physicians or clinics that may be able to assist you with 

follow up care.





2.  The Emergency Physician has interpreted your x-rays.  The X-Ray specialist 

will also 


review them.  If there is a change in the findings, you will be notified in 48 

hours when at 


all possible.





3.  A lab test or culture has been done, your results will be reviewed and you 

will be 


notified if you need a change in treatment.





ADDITIONAL INSTRUCTIONS AND INFORMATION:





1.  Your care today has been supervised by a physician who is specially trained 

in emergency 


care.  Many problems require more than one evaluation for a complete diagnosis 

and 


treatment.  We recommend that you schedule your follow up appointment as 

recommended to 


ensure complete treatment of you illness or injury.  If you are unable to obtain

 follow up 


care and continue to have a problem, or if your condition worsens, we recommend 

that you 


return to the ED.





2.  We are not able to safely determine your condition over the phone nor are we

 able to 


give sound medical advice over the phone.  For these safety reasons, if you call

 for medical 


advice we will ask you to come to the ED for further evaluation.





3.  If you have any questions regarding these discharge instructions please call

 the ED at 


[phone].





SAFETY INFORMATION:





In the interest of safety, wellness, and injury prevention; we encourage you to 

wear your 


sealbelt, if you smoke; quite smoking, and we encourage family to use a 

protective helmet 


for bicycling and other sporting events that present an increased risk for head 

injury.





IF YOUR SYMPTOMS WORSEN OR NEW SYMPTOMS DEVELOP, OR YOU HAVE CONCERNS ABOUT YOUR

 CONDITION; 


OR IF YOUR CONDITION WORSENS WHILE YOU ARE WAITING FOR YOUR FOLLOW UP 

APPOINTMENT; EITHER 


CONTACT YOUR PRIMARY CARE DOCTOR, THE PHYSICIAN WHOSE NAME AND NUMBER YOU WERE 

GIVEN, OR 


RETURN TO THE ED IMMEDIATELY.


Scripts


Hydrocodone Bit/Acetaminophen (HYDROCODONE-APAP 5-325  **) 1 Each Tablet


1 TAB PO PRN Q8HRS PRN for PAIN for 3 Days, #6 TAB 0 Refills


   Prov: PALOMA GREEN         5/16/21











PALOMA GREEN               May 16, 2021 12:47